# Patient Record
Sex: FEMALE | Race: WHITE | NOT HISPANIC OR LATINO | Employment: STUDENT | URBAN - METROPOLITAN AREA
[De-identification: names, ages, dates, MRNs, and addresses within clinical notes are randomized per-mention and may not be internally consistent; named-entity substitution may affect disease eponyms.]

---

## 2017-02-24 RX ORDER — NORETHINDRONE ACETATE AND ETHINYL ESTRADIOL, ETHINYL ESTRADIOL AND FERROUS FUMARATE 1MG-10(24)
KIT ORAL
Qty: 84 TABLET | Refills: 1 | Status: SHIPPED | OUTPATIENT
Start: 2017-02-24 | End: 2017-08-07 | Stop reason: SDUPTHER

## 2017-08-07 RX ORDER — NORETHINDRONE ACETATE AND ETHINYL ESTRADIOL, ETHINYL ESTRADIOL AND FERROUS FUMARATE 1MG-10(24)
KIT ORAL
Qty: 28 TABLET | Refills: 0 | Status: SHIPPED | OUTPATIENT
Start: 2017-08-07 | End: 2017-08-23 | Stop reason: SDUPTHER

## 2017-08-23 ENCOUNTER — TELEPHONE (OUTPATIENT)
Dept: OBSTETRICS AND GYNECOLOGY | Age: 22
End: 2017-08-23

## 2017-08-23 NOTE — TELEPHONE ENCOUNTER
Dr CLINE pt, Pt needing refill of LO LO FE sent to pharm. Pt has AE sched for 09-15-17. Please advise.

## 2017-09-01 ENCOUNTER — TELEPHONE (OUTPATIENT)
Dept: OBSTETRICS AND GYNECOLOGY | Age: 22
End: 2017-09-01

## 2017-09-01 RX ORDER — NORETHINDRONE ACETATE AND ETHINYL ESTRADIOL, ETHINYL ESTRADIOL AND FERROUS FUMARATE 1MG-10(24)
KIT ORAL
Qty: 28 TABLET | Refills: 0 | Status: SHIPPED | OUTPATIENT
Start: 2017-09-01 | End: 2017-10-13 | Stop reason: SDUPTHER

## 2017-09-01 NOTE — TELEPHONE ENCOUNTER
Can we auth this? The 1 pack that was sent last week to get her through til her appt was sent to home delivery. This request is from the correct pharm.

## 2017-10-13 ENCOUNTER — OFFICE VISIT (OUTPATIENT)
Dept: OBSTETRICS AND GYNECOLOGY | Age: 22
End: 2017-10-13

## 2017-10-13 VITALS
HEIGHT: 70 IN | DIASTOLIC BLOOD PRESSURE: 74 MMHG | SYSTOLIC BLOOD PRESSURE: 116 MMHG | BODY MASS INDEX: 34.93 KG/M2 | WEIGHT: 244 LBS

## 2017-10-13 DIAGNOSIS — Z01.419 ENCOUNTER FOR GYNECOLOGICAL EXAMINATION: Primary | ICD-10-CM

## 2017-10-13 PROCEDURE — 99395 PREV VISIT EST AGE 18-39: CPT | Performed by: NURSE PRACTITIONER

## 2017-10-13 RX ORDER — BUPROPION HYDROCHLORIDE 150 MG/1
TABLET ORAL
COMMUNITY
Start: 2017-10-10 | End: 2018-12-17

## 2017-10-13 RX ORDER — ESCITALOPRAM OXALATE 10 MG/1
TABLET ORAL
COMMUNITY
Start: 2017-09-24 | End: 2018-12-17

## 2017-10-13 NOTE — PROGRESS NOTES
Subjective       History of Present Illness    Chief Complaint   Patient presents with   • Gynecologic Exam       Jolly Lui is a 22 y.o. female who presents for annual exam.  Here for annual.  No problems or concerns.  She was recently started on Lexapro for anxiety and it has seemed to help but she also started on wellbutrin to decrease side effects.  She is not sexually active and has never been.  She originally started on ocps for acne and has just continued them to make her cycles lighter.  No changes in medical history.  She is a student at [employer] and considering [email or web address] nursing program at some point.  OB History    Para Term  AB Living   0 0 0 0 0 0   SAB TAB Ectopic Multiple Live Births   0 0 0 0              The following portions of the patient's history were reviewed and updated as appropriate: allergies, current medications, past family history, past medical history, past social history, past surgical history and problem list.    Her menses are regular every 28-30 days, lasting 4-7 days.    Current contraception: abstinence  History of abnormal Pap smear: no  Received Gardasil immunization: yes - x3  Perform regular self breast exam: no  Family history of uterine or ovarian cancer: no  Family History of colon cancer: no  Family history of breast cancer: no    Mammogram: not indicated.  Colonoscopy: not indicated.  DEXA: not indicated.  Last Pap:never    Smoking status: Never Smoker                                                                 Smokeless status: Not on file                       Exercise: moderately active  Calcium/Vitamin D: adequate intake    The following portions of the patient's history were reviewed and updated as appropriate: allergies, current medications, past family history, past medical history, past social history, past surgical history and problem list.    Review of Systems   Constitutional: Negative.    HENT: Negative.    Eyes: Negative.   "  Respiratory: Negative.    Cardiovascular: Negative.    Gastrointestinal: Negative.    Endocrine: Negative.    Genitourinary: Negative.    Musculoskeletal: Negative.    Skin: Negative.    Allergic/Immunologic: Negative.    Neurological: Negative.    Hematological: Negative.    Psychiatric/Behavioral: Negative.          Objective   Physical Exam   Constitutional: She is oriented to person, place, and time. She appears well-developed and well-nourished.   Neck: No thyroid mass present.   Cardiovascular: Normal rate, regular rhythm and normal heart sounds.    Pulmonary/Chest: Effort normal and breath sounds normal. Right breast exhibits no mass, no nipple discharge, no skin change and no tenderness. Left breast exhibits no mass, no nipple discharge, no skin change and no tenderness.   Abdominal: Soft. There is no tenderness.   Genitourinary: Vagina normal and uterus normal. There is no rash or lesion on the right labia. There is no rash or lesion on the left labia. Cervix exhibits no motion tenderness, no discharge and no friability. Right adnexum displays no mass and no tenderness. Left adnexum displays no mass and no tenderness.   Neurological: She is alert and oriented to person, place, and time.   Psychiatric: She has a normal mood and affect. Her behavior is normal.   Vitals reviewed.      /74  Ht 70\" (177.8 cm)  Wt 244 lb (111 kg)  LMP 09/21/2017  BMI 35.01 kg/m2    Assessment/Plan   Jolly was seen today for gynecologic exam.    Diagnoses and all orders for this visit:    Encounter for gynecological examination  -     IGP,rfx Aptima HPV All Pth - ThinPrep Vial, Cervix; Future    Other orders  -     Norethin-Eth Estrad-Fe Biphas (LO LOESTRIN FE) 1 MG-10 MCG / 10 MCG tablet; Take 1 tablet by mouth Daily.        Breast self exam technique reviewed and patient encouraged to perform self-exam monthly.  Discussed healthy lifestyle modifications.  Pap smear done today  Recommended 30 minutes of aerobic " exercise five times per week.  Discussed calcium needs to prevent osteoporosis  Encouraged condoms if sexually active

## 2017-10-16 LAB
CONV .: NORMAL
CYTOLOGIST CVX/VAG CYTO: NORMAL
CYTOLOGY CVX/VAG DOC THIN PREP: NORMAL
DX ICD CODE: NORMAL
HIV 1 & 2 AB SER-IMP: NORMAL
OTHER STN SPEC: NORMAL
PATH REPORT.FINAL DX SPEC: NORMAL
STAT OF ADQ CVX/VAG CYTO-IMP: NORMAL

## 2017-10-19 ENCOUNTER — TELEPHONE (OUTPATIENT)
Dept: OBSTETRICS AND GYNECOLOGY | Age: 22
End: 2017-10-19

## 2017-12-01 ENCOUNTER — TELEPHONE (OUTPATIENT)
Dept: OBSTETRICS AND GYNECOLOGY | Age: 22
End: 2017-12-01

## 2018-11-16 RX ORDER — NORETHINDRONE ACETATE AND ETHINYL ESTRADIOL, ETHINYL ESTRADIOL AND FERROUS FUMARATE 1MG-10(24)
KIT ORAL
Qty: 84 TABLET | Refills: 0 | Status: SHIPPED | OUTPATIENT
Start: 2018-11-16 | End: 2018-12-17

## 2018-12-17 ENCOUNTER — OFFICE VISIT (OUTPATIENT)
Dept: OBSTETRICS AND GYNECOLOGY | Age: 23
End: 2018-12-17

## 2018-12-17 VITALS
DIASTOLIC BLOOD PRESSURE: 70 MMHG | HEIGHT: 69 IN | SYSTOLIC BLOOD PRESSURE: 102 MMHG | WEIGHT: 261 LBS | BODY MASS INDEX: 38.66 KG/M2

## 2018-12-17 DIAGNOSIS — Z01.419 ENCOUNTER FOR GYNECOLOGICAL EXAMINATION WITHOUT ABNORMAL FINDING: Primary | ICD-10-CM

## 2018-12-17 PROCEDURE — 99395 PREV VISIT EST AGE 18-39: CPT | Performed by: OBSTETRICS & GYNECOLOGY

## 2018-12-17 RX ORDER — INFLUENZA VIRUS VACCINE 15; 15; 15; 15 UG/.5ML; UG/.5ML; UG/.5ML; UG/.5ML
SUSPENSION INTRAMUSCULAR
COMMUNITY
Start: 2018-10-26

## 2018-12-17 NOTE — PROGRESS NOTES
Routine Annual Visit    2018    Patient: Jolly Lui          MR#:[id number]      Chief Complaint   Patient presents with   • Annual Exam     PT HERE FOR ROUTINE AE, SHE IS WELL WITH NO COMPLAINTS. STOPPED BC DUE TO NOT BEING SEXUALLY ACTIVE AND DOESN'T FEEL LIKE SHE NEEDS IT. LAST PAP 2017 NEG.       History of Present Illness    23 y.o. female  who presents for annual exam.   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 Patient is not currently having periods (Reason: Other).  Obstetric History:  OB History      Para Term  AB Living    0 0 0 0 0 0    SAB TAB Ectopic Molar Multiple Live Births    0 0 0   0           Menstrual History:     No LMP recorded. Patient is not currently having periods (Reason: Other).       Sexual History:       ________________________________________  There is no problem list on file for this patient.      Past Medical History:   Diagnosis Date   • Dysmenorrhea    • Fatigue    • Heat intolerance        Past Surgical History:   Procedure Laterality Date   • KNEE CARTILAGE SURGERY Right        Social History     Tobacco Use   Smoking Status Never Smoker       has a current medication list which includes the following prescription(s): fluarix quadrivalent.  ________________________________________    Current contraception: abstinence  History of abnormal Pap smear: no  Family history of Breast cancer: no  Family history of uterine or ovarian cancer: no  Family History of colon cancer/colon polyps: no  History of abnormal mammogram: no      The following portions of the patient's history were reviewed and updated as appropriate: allergies, current medications, past family history, past medical history, past social history, past surgical history and problem list.    Review of  "Systems    Pertinent items are noted in HPI.     Objective   Physical Exam    /70   Ht 175.3 cm (69\")   Wt 118 kg (261 lb)   BMI 38.54 kg/m²    BP Readings from Last 3 Encounters:   12/17/18 102/70   10/13/17 116/74      Wt Readings from Last 3 Encounters:   12/17/18 118 kg (261 lb)   10/13/17 111 kg (244 lb)      BMI: Estimated body mass index is 38.54 kg/m² as calculated from the following:    Height as of this encounter: 175.3 cm (69\").    Weight as of this encounter: 118 kg (261 lb).      General:   alert, appears stated age and cooperative   Abdomen: soft, non-tender, without masses or organomegaly   Breast: inspection negative, no nipple discharge or bleeding, no masses or nodularity palpable   Vulva: normal   Vagina: normal mucosa   Cervix: no cervical motion tenderness and no lesions   Uterus: normal size, mobile or non-tender   Adnexa: no mass, fullness, tenderness     Assessment:    1. Normal annual exam   Assessment     ICD-10-CM ICD-9-CM   1. Encounter for gynecological examination without abnormal finding Z01.419 V72.31     Plan:    Plan     []  Mammogram request made  []  PAP done  []  Labs:   []  GC/Chl/TV  []  DEXA scan   []  Referral for colonoscopy:       Jolly was seen today for annual exam.    Diagnoses and all orders for this visit:    Encounter for gynecological examination without abnormal finding            Counseling:  --Nutrition: Stressed importance of moderation and caloric balance, stressed fresh fruit and vegetables  --Exercise: Stressed the importance of regular exercise. 3-5 times weekly       --Discussed pap smear screening recommendations    "

## 2019-02-08 RX ORDER — NORETHINDRONE ACETATE AND ETHINYL ESTRADIOL, ETHINYL ESTRADIOL AND FERROUS FUMARATE 1MG-10(24)
KIT ORAL
Qty: 84 TABLET | Refills: 0 | OUTPATIENT
Start: 2019-02-08

## 2019-02-08 NOTE — TELEPHONE ENCOUNTER
Pt stated at ae in December 2018 that she didn't want to take BCP. Does she now want it refilled?

## 2019-04-03 ENCOUNTER — TELEPHONE (OUTPATIENT)
Dept: OBSTETRICS AND GYNECOLOGY | Age: 24
End: 2019-04-03

## 2019-04-03 NOTE — TELEPHONE ENCOUNTER
Pt is now requesting a months worth to be called into the [facility] in [place] because [facility] usually takes 2 weeks. Pharmacy is on file. Thank you

## 2019-04-03 NOTE — TELEPHONE ENCOUNTER
Dr Lainez pt. Last AE 12/17/18. Says she spoke with Dr Lainez at her last appt about possibly continuing her BC that she was on in 12/2017, the  Norethin-Eth Estrad-Fe Biphas 1 MG-10 MCG / 10 MCG. Dr Lainez told her to let her know when she was ready and she wants it now. Does she need to schedule at appt with Dr Lainez or can we fill this? Thanks

## 2019-04-03 NOTE — TELEPHONE ENCOUNTER
Ok to start the pill since she is utd on her exams.  Advise her to start pill first day of her menses.  Use condoms for the first month and thereafter if SA with new partner to protect against STD's.  Expect irregular bleeding the first 3 months of use.  Call back if any problems, otherwise, plan routine f/u for her annual in Dec

## 2019-10-07 RX ORDER — NORETHINDRONE ACETATE AND ETHINYL ESTRADIOL, ETHINYL ESTRADIOL AND FERROUS FUMARATE 1MG-10(24)
KIT ORAL
Qty: 28 TABLET | Refills: 2 | Status: SHIPPED | OUTPATIENT
Start: 2019-10-07 | End: 2019-12-09 | Stop reason: SDUPTHER

## 2019-12-02 ENCOUNTER — TELEPHONE (OUTPATIENT)
Dept: OBSTETRICS AND GYNECOLOGY | Age: 24
End: 2019-12-02

## 2019-12-09 ENCOUNTER — TELEPHONE (OUTPATIENT)
Dept: OBSTETRICS AND GYNECOLOGY | Age: 24
End: 2019-12-09

## 2019-12-09 NOTE — TELEPHONE ENCOUNTER
(Link pt) Pt is needing her BCP prescription to be called into the [facility] on file rather than mail service.

## 2019-12-09 NOTE — TELEPHONE ENCOUNTER
See note      Please notify patient that I have sent that in electronically and she should call pharmacy to make sure it is filled by tomorrow

## 2019-12-23 ENCOUNTER — OFFICE VISIT (OUTPATIENT)
Dept: OBSTETRICS AND GYNECOLOGY | Age: 24
End: 2019-12-23

## 2019-12-23 VITALS
WEIGHT: 254 LBS | DIASTOLIC BLOOD PRESSURE: 74 MMHG | SYSTOLIC BLOOD PRESSURE: 114 MMHG | BODY MASS INDEX: 37.62 KG/M2 | HEIGHT: 69 IN

## 2019-12-23 DIAGNOSIS — Z01.419 ENCOUNTER FOR GYNECOLOGICAL EXAMINATION WITHOUT ABNORMAL FINDING: Primary | ICD-10-CM

## 2019-12-23 DIAGNOSIS — Z11.3 SCREEN FOR STD (SEXUALLY TRANSMITTED DISEASE): ICD-10-CM

## 2019-12-23 PROCEDURE — 99395 PREV VISIT EST AGE 18-39: CPT | Performed by: OBSTETRICS & GYNECOLOGY

## 2019-12-23 NOTE — PROGRESS NOTES
"Routine Annual Visit    2019    Patient: Jolly Lui          MR#:[id number]      Chief Complaint   Patient presents with   • Gynecologic Exam     AE TODAY.  2017 PAP = NEG.  OCP REFILL NEEDED        History of Present Illness    24 y.o. female  who presents for annual exam.   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            Patient's last menstrual period was 2019.  Obstetric History:  OB History        0    Para   0    Term   0       0    AB   0    Living   0       SAB   0    TAB   0    Ectopic   0    Molar        Multiple   0    Live Births                   Menstrual History:     Patient's last menstrual period was 2019.       Sexual History:       ________________________________________  There is no problem list on file for this patient.      Past Medical History:   Diagnosis Date   • Dysmenorrhea    • Fatigue    • Heat intolerance        Past Surgical History:   Procedure Laterality Date   • KNEE CARTILAGE SURGERY Right        Social History     Tobacco Use   Smoking Status Never Smoker   Smokeless Tobacco Never Used       has a current medication list which includes the following prescription(s): fluarix quadrivalent and norethin-eth estrad-fe biphas.  ________________________________________    Current contraception: OCP (estrogen/progesterone)  History of abnormal Pap smear: no  Family history of Breast cancer: no        The following portions of the patient's history were reviewed and updated as appropriate: allergies, current medications, past family history, past medical history, past social history, past surgical history and problem list.    Review of Systems    Pertinent items are noted in HPI.     Objective   Physical Exam    /74   Ht 175.3 cm (69\")   Wt 115 kg (254 lb)   LMP 2019   Breastfeeding No   BMI 37.51 kg/m²    BP Readings from " "Last 3 Encounters:   12/23/19 114/74   12/17/18 102/70   10/13/17 116/74      Wt Readings from Last 3 Encounters:   12/23/19 115 kg (254 lb)   12/17/18 118 kg (261 lb)   10/13/17 111 kg (244 lb)      BMI: Estimated body mass index is 37.51 kg/m² as calculated from the following:    Height as of this encounter: 175.3 cm (69\").    Weight as of this encounter: 115 kg (254 lb).      General:   alert, appears stated age and cooperative   Abdomen: soft, non-tender, without masses or organomegaly   Breast: inspection negative, no nipple discharge or bleeding, no masses or nodularity palpable   Vulva: normal   Vagina: normal mucosa   Cervix: no cervical motion tenderness and no lesions   Uterus: normal size, mobile or non-tender   Adnexa: no mass, fullness, tenderness     Assessment:    1. Normal annual exam   Assessment     ICD-10-CM ICD-9-CM   1. Encounter for gynecological examination without abnormal finding Z01.419 V72.31   2. Screen for STD (sexually transmitted disease) Z11.3 V74.5     Plan:    Plan       []  PAP done  []  Labs:   [x]  GC/Chl/TV          Jolly was seen today for gynecologic exam.    Diagnoses and all orders for this visit:    Encounter for gynecological examination without abnormal finding    Screen for STD (sexually transmitted disease)  -     Chlamydia trachomatis, Neisseria gonorrhoeae, Trichomonas vaginalis, PCR - Swab, Vagina    Other orders  -     Norethin-Eth Estrad-Fe Biphas (LO LOESTRIN FE) 1 MG-10 MCG / 10 MCG tablet; Take 1 tablet by mouth Daily.            Counseling:  --Nutrition: Stressed importance of moderation and caloric balance, stressed fresh fruit and vegetables  --Exercise: Stressed the importance of regular exercise. 3-5 times weekly       --Discussed pap smear screening recommendations    "

## 2019-12-26 LAB
C TRACH RRNA SPEC QL NAA+PROBE: NEGATIVE
N GONORRHOEA RRNA SPEC QL NAA+PROBE: NEGATIVE
T VAGINALIS DNA SPEC QL NAA+PROBE: NEGATIVE

## 2019-12-27 ENCOUNTER — TELEPHONE (OUTPATIENT)
Dept: OBSTETRICS AND GYNECOLOGY | Age: 24
End: 2019-12-27

## 2020-11-13 RX ORDER — NORETHINDRONE ACETATE AND ETHINYL ESTRADIOL, ETHINYL ESTRADIOL AND FERROUS FUMARATE 1MG-10(24)
KIT ORAL
Qty: 28 TABLET | Refills: 2 | Status: SHIPPED | OUTPATIENT
Start: 2020-11-13 | End: 2020-11-16

## 2020-11-16 ENCOUNTER — TELEPHONE (OUTPATIENT)
Dept: OBSTETRICS AND GYNECOLOGY | Age: 25
End: 2020-11-16

## 2020-11-16 RX ORDER — NORETHINDRONE ACETATE AND ETHINYL ESTRADIOL, ETHINYL ESTRADIOL AND FERROUS FUMARATE 1MG-10(24)
1 KIT ORAL DAILY
Qty: 84 TABLET | Refills: 0 | Status: SHIPPED | OUTPATIENT
Start: 2020-11-16 | End: 2020-12-22 | Stop reason: SDUPTHER

## 2020-11-16 NOTE — TELEPHONE ENCOUNTER
(Migel Pt)     Deniz from [facility] pharmacy called stating the insurance wants the pt to get a 90 day supply for the BC Lo Loestrin Fe 1MG.   Told to have refill request sent in that way.     Deniz- [phone]

## 2020-12-28 ENCOUNTER — OFFICE VISIT (OUTPATIENT)
Dept: OBSTETRICS AND GYNECOLOGY | Age: 25
End: 2020-12-28

## 2020-12-28 VITALS
WEIGHT: 243 LBS | BODY MASS INDEX: 32.91 KG/M2 | DIASTOLIC BLOOD PRESSURE: 70 MMHG | HEIGHT: 72 IN | SYSTOLIC BLOOD PRESSURE: 102 MMHG

## 2020-12-28 DIAGNOSIS — Z01.419 ENCOUNTER FOR GYNECOLOGICAL EXAMINATION (GENERAL) (ROUTINE) WITHOUT ABNORMAL FINDINGS: Primary | ICD-10-CM

## 2020-12-28 DIAGNOSIS — Z12.4 PAP SMEAR FOR CERVICAL CANCER SCREENING: ICD-10-CM

## 2020-12-28 PROCEDURE — 99395 PREV VISIT EST AGE 18-39: CPT | Performed by: OBSTETRICS & GYNECOLOGY

## 2020-12-28 RX ORDER — NORETHINDRONE ACETATE AND ETHINYL ESTRADIOL, ETHINYL ESTRADIOL AND FERROUS FUMARATE 1MG-10(24)
1 KIT ORAL DAILY
Qty: 84 TABLET | Refills: 3 | Status: SHIPPED | OUTPATIENT
Start: 2020-12-28 | End: 2022-02-16

## 2020-12-28 NOTE — PROGRESS NOTES
Routine Annual Visit    2020    Patient: Jolly Lui          MR#:[id number]      Chief Complaint   Patient presents with   • Gynecologic Exam     AE today.  Last pap  = neg/neg.  Contraception = OCP refill pended. New surgery = colonoscopy in 2020 = normal.         History of Present Illness    25 y.o. female  who presents for annual exam.   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  Obstetric History:  OB History        0    Para   0    Term   0       0    AB   0    Living   0       SAB   0    TAB   0    Ectopic   0    Molar        Multiple   0    Live Births                   Menstrual History:     Patient's last menstrual period was 2020.       Sexual History:       ________________________________________  There is no problem list on file for this patient.      Past Medical History:   Diagnosis Date   • Dysmenorrhea    • Fatigue    • Heat intolerance        Past Surgical History:   Procedure Laterality Date   • COLONOSCOPY  2020   • KNEE CARTILAGE SURGERY Right        Social History     Tobacco Use   Smoking Status Never Smoker   Smokeless Tobacco Never Used       has a current medication list which includes the following prescription(s): fluarix quadrivalent and lo loestrin fe.  ________________________________________    Current contraception: OCP (estrogen/progesterone)  History of abnormal Pap smear: no  Family history of Breast cancer: no        The following portions of the patient's history were reviewed and updated as appropriate: allergies, current medications, past family history, past medical history, past social history, past surgical history and problem list.    Review of Systems    Pertinent items are noted in HPI.     Objective   Physical Exam    /70  "  Ht 182.9 cm (72\")   Wt 110 kg (243 lb)   LMP 12/21/2020   Breastfeeding No   BMI 32.96 kg/m²    BP Readings from Last 3 Encounters:   12/28/20 102/70   12/23/19 114/74   12/17/18 102/70      Wt Readings from Last 3 Encounters:   12/28/20 110 kg (243 lb)   12/23/19 115 kg (254 lb)   12/17/18 118 kg (261 lb)      BMI: Estimated body mass index is 32.96 kg/m² as calculated from the following:    Height as of this encounter: 182.9 cm (72\").    Weight as of this encounter: 110 kg (243 lb).      General:   alert, appears stated age and cooperative   Abdomen: soft, non-tender, without masses or organomegaly   Breast: inspection negative, no nipple discharge or bleeding, no masses or nodularity palpable   Vulva: normal   Vagina: normal mucosa   Cervix: no cervical motion tenderness and no lesions   Uterus: normal size, mobile or non-tender   Adnexa: no mass, fullness, tenderness     Assessment:    1. Normal annual exam   Assessment     ICD-10-CM ICD-9-CM   1. Encounter for gynecological examination (general) (routine) without abnormal findings  Z01.419 V72.31   2. Pap smear for cervical cancer screening  Z12.4 V76.2     Plan:    Plan       [x]  PAP done  []  Labs:   [x]  GC/Chl/TV          Diagnoses and all orders for this visit:    1. Encounter for gynecological examination (general) (routine) without abnormal findings (Primary)    2. Pap smear for cervical cancer screening  -     IGP,CtNgTv,rfx Aptima HPV ASCU    Other orders  -     Cancel: IGP, Rfx Aptima HPV ASCU  -     Norethin-Eth Estrad-Fe Biphas (Lo Loestrin Fe) 1 MG-10 MCG / 10 MCG tablet; Take 1 tablet by mouth Daily.  Dispense: 84 tablet; Refill: 3            Counseling:  --Nutrition: Stressed importance of moderation and caloric balance, stressed fresh fruit and vegetables  --Exercise: Stressed the importance of regular exercise. 3-5 times weekly       --Discussed pap smear screening recommendations    "

## 2020-12-30 LAB
C TRACH RRNA CVX QL NAA+PROBE: NEGATIVE
CONV .: NORMAL
CYTOLOGIST CVX/VAG CYTO: NORMAL
CYTOLOGY CVX/VAG DOC CYTO: NORMAL
CYTOLOGY CVX/VAG DOC THIN PREP: NORMAL
DX ICD CODE: NORMAL
HIV 1 & 2 AB SER-IMP: NORMAL
N GONORRHOEA RRNA CVX QL NAA+PROBE: NEGATIVE
OTHER STN SPEC: NORMAL
STAT OF ADQ CVX/VAG CYTO-IMP: NORMAL
T VAGINALIS RRNA SPEC QL NAA+PROBE: NEGATIVE

## 2021-01-05 ENCOUNTER — TELEPHONE (OUTPATIENT)
Dept: OBSTETRICS AND GYNECOLOGY | Age: 26
End: 2021-01-05

## 2021-04-27 ENCOUNTER — TELEPHONE (OUTPATIENT)
Dept: OBSTETRICS AND GYNECOLOGY | Age: 26
End: 2021-04-27

## 2021-04-27 NOTE — TELEPHONE ENCOUNTER
Pt calls needing PA for Kayla M[facility] will no longer [facility] prescription. Pt also wondering if she can get samples for May, she will be out of prescription on Friday

## 2022-02-16 RX ORDER — NORETHINDRONE ACETATE AND ETHINYL ESTRADIOL, ETHINYL ESTRADIOL AND FERROUS FUMARATE 1MG-10(24)
1 KIT ORAL DAILY
Qty: 84 TABLET | Refills: 0 | Status: SHIPPED | OUTPATIENT
Start: 2022-02-16